# Patient Record
Sex: MALE | Race: WHITE | NOT HISPANIC OR LATINO | Employment: STUDENT | URBAN - METROPOLITAN AREA
[De-identification: names, ages, dates, MRNs, and addresses within clinical notes are randomized per-mention and may not be internally consistent; named-entity substitution may affect disease eponyms.]

---

## 2021-03-31 ENCOUNTER — TELEPHONE (OUTPATIENT)
Dept: REHABILITATION | Facility: REHABILITATION | Age: 26
End: 2021-03-31

## 2021-03-31 NOTE — TELEPHONE ENCOUNTER
"Received call from mother, Steffanie Chaney, regarding transfer of services from 3rd party driving school (\"[employer] \" in [place]) to [facility] due to scheduling difficulty and geographical proximity. Mother reports dx of autism with concerns with anxiety/OCD. Per mother, prospective client feels he needs more time to learn and is not yet ready to take his test. Mother reports driving with her son in addition to 16 hours of prior training (previously covered under waiver program) and reports concerns with managing multi-stop intersections, knowing right of way in nuanced situations, appropriate responses to yield, etc. Mother reports coverage by [facility] for additional training. Mother counseled to contact [facility] counselor to coordinate referral and coverage of services. Mother also encouraged to obtain any records from [employer] for review by ANATOLY. Mother informed that a formal evaluation will be required prior to continuation of training with [facility].    Vinny Rob MS, OTR/L, DRS, CDI  Occupational Therapist   Rehabilitation Specialist, Certified     "

## 2021-04-15 ENCOUNTER — APPOINTMENT (RX ONLY)
Dept: URBAN - METROPOLITAN AREA CLINIC 374 | Facility: CLINIC | Age: 26
Setting detail: DERMATOLOGY
End: 2021-04-15

## 2021-04-15 DIAGNOSIS — D18.0 HEMANGIOMA: ICD-10-CM

## 2021-04-15 DIAGNOSIS — D22 MELANOCYTIC NEVI: ICD-10-CM

## 2021-04-15 DIAGNOSIS — Z71.89 OTHER SPECIFIED COUNSELING: ICD-10-CM

## 2021-04-15 DIAGNOSIS — L82.1 OTHER SEBORRHEIC KERATOSIS: ICD-10-CM

## 2021-04-15 DIAGNOSIS — L81.4 OTHER MELANIN HYPERPIGMENTATION: ICD-10-CM

## 2021-04-15 PROBLEM — D22.5 MELANOCYTIC NEVI OF TRUNK: Status: ACTIVE | Noted: 2021-04-15

## 2021-04-15 PROBLEM — D48.5 NEOPLASM OF UNCERTAIN BEHAVIOR OF SKIN: Status: ACTIVE | Noted: 2021-04-15

## 2021-04-15 PROBLEM — D18.01 HEMANGIOMA OF SKIN AND SUBCUTANEOUS TISSUE: Status: ACTIVE | Noted: 2021-04-15

## 2021-04-15 PROCEDURE — ? PHOTO-DOCUMENTATION

## 2021-04-15 PROCEDURE — 99203 OFFICE O/P NEW LOW 30 MIN: CPT | Mod: 25

## 2021-04-15 PROCEDURE — 11103 TANGNTL BX SKIN EA SEP/ADDL: CPT

## 2021-04-15 PROCEDURE — ? COUNSELING

## 2021-04-15 PROCEDURE — 11102 TANGNTL BX SKIN SINGLE LES: CPT

## 2021-04-15 PROCEDURE — ? BIOPSY BY SHAVE METHOD

## 2021-04-15 PROCEDURE — ? SUNSCREEN RECOMMENDATIONS

## 2021-04-15 PROCEDURE — ? FULL BODY SKIN EXAM

## 2021-04-15 ASSESSMENT — LOCATION DETAILED DESCRIPTION DERM
LOCATION DETAILED: LEFT SUPERIOR LATERAL LOWER BACK
LOCATION DETAILED: LEFT ANTERIOR PROXIMAL THIGH
LOCATION DETAILED: EPIGASTRIC SKIN
LOCATION DETAILED: LEFT SUPERIOR FOREHEAD
LOCATION DETAILED: RIGHT LATERAL SUPERIOR CHEST
LOCATION DETAILED: RIGHT INFERIOR MEDIAL UPPER BACK

## 2021-04-15 ASSESSMENT — LOCATION ZONE DERM
LOCATION ZONE: FACE
LOCATION ZONE: LEG
LOCATION ZONE: TRUNK

## 2021-04-15 ASSESSMENT — LOCATION SIMPLE DESCRIPTION DERM
LOCATION SIMPLE: LEFT THIGH
LOCATION SIMPLE: ABDOMEN
LOCATION SIMPLE: RIGHT UPPER BACK
LOCATION SIMPLE: LEFT FOREHEAD
LOCATION SIMPLE: CHEST
LOCATION SIMPLE: LEFT LOWER BACK

## 2021-04-22 ENCOUNTER — TELEPHONE (OUTPATIENT)
Dept: REHABILITATION | Facility: REHABILITATION | Age: 26
End: 2021-04-22

## 2021-04-22 ENCOUNTER — TRANSCRIBE ORDERS (OUTPATIENT)
Dept: REHABILITATION | Facility: REHABILITATION | Age: 26
End: 2021-04-22

## 2021-04-22 DIAGNOSIS — F84.0 AUTISTIC DISORDER: Primary | ICD-10-CM

## 2021-04-22 DIAGNOSIS — F42.9 OBSESSIVE-COMPULSIVE DISORDER, UNSPECIFIED: ICD-10-CM

## 2021-04-22 NOTE — TELEPHONE ENCOUNTER
DRP order received/transcribed from PCP. Left message for patient's mother to cassandra both clinical/road eval. Please schedule with either EB or BL on a day with no other appointments starting 9am-1pm. Patient will go through [facility]--do not collect copayment

## 2021-05-06 ENCOUNTER — APPOINTMENT (RX ONLY)
Dept: URBAN - METROPOLITAN AREA CLINIC 374 | Facility: CLINIC | Age: 26
Setting detail: DERMATOLOGY
End: 2021-05-06

## 2021-05-06 DIAGNOSIS — D22 MELANOCYTIC NEVI: ICD-10-CM

## 2021-05-06 PROBLEM — D22.5 MELANOCYTIC NEVI OF TRUNK: Status: ACTIVE | Noted: 2021-05-06

## 2021-05-06 PROBLEM — D22.39 MELANOCYTIC NEVI OF OTHER PARTS OF FACE: Status: ACTIVE | Noted: 2021-05-06

## 2021-05-06 PROCEDURE — 12051 INTMD RPR FACE/MM 2.5 CM/<: CPT

## 2021-05-06 PROCEDURE — 11441 EXC FACE-MM B9+MARG 0.6-1 CM: CPT

## 2021-05-06 PROCEDURE — 99212 OFFICE O/P EST SF 10 MIN: CPT | Mod: 25

## 2021-05-06 PROCEDURE — ? PUNCH EXCISION

## 2021-05-06 PROCEDURE — ? PHOTO-DOCUMENTATION

## 2021-05-06 PROCEDURE — ? OBSERVATION

## 2021-05-06 ASSESSMENT — LOCATION DETAILED DESCRIPTION DERM
LOCATION DETAILED: LEFT SUPERIOR FOREHEAD
LOCATION DETAILED: LEFT SUPERIOR LATERAL LOWER BACK

## 2021-05-06 ASSESSMENT — LOCATION ZONE DERM
LOCATION ZONE: TRUNK
LOCATION ZONE: FACE

## 2021-05-06 ASSESSMENT — LOCATION SIMPLE DESCRIPTION DERM
LOCATION SIMPLE: LEFT LOWER BACK
LOCATION SIMPLE: LEFT FOREHEAD

## 2021-06-14 ENCOUNTER — HOSPITAL ENCOUNTER (OUTPATIENT)
Dept: REHABILITATION | Facility: REHABILITATION | Age: 26
Setting detail: THERAPIES SERIES
Discharge: HOME | End: 2021-06-14
Attending: FAMILY MEDICINE
Payer: COMMERCIAL

## 2021-06-14 DIAGNOSIS — F84.0 AUTISTIC SPECTRUM DISORDER: Primary | ICD-10-CM

## 2021-06-14 DIAGNOSIS — F42.9 OBSESSIVE-COMPULSIVE DISORDER, UNSPECIFIED TYPE: ICD-10-CM

## 2021-06-14 PROCEDURE — 99000065 HC VEHICLE EVAL 120 MIN

## 2021-06-14 PROCEDURE — 99000064 HC PRE-DRIVER EVAL 120 MIN

## 2021-06-14 ASSESSMENT — MONTREAL COGNITIVE ASSESSMENT (MOCA): WHAT IS THE TOTAL SCORE (OUT OF 30): NT
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
## 2021-06-14 NOTE — PROGRESS NOTES
[facility]   REHABILITATION PROGRAM  DRIVING EVALUATION REPORT    EVALUATION DATE: 2021    NAME:  Chriss Rodriguez : 1995 AGE: 25 y.o.  MRN: [id number]     ADDRESS:  [address] [place] [address] [address] [place] [address]     [facility] PHONE NUMBER:   [phone]    REFERRING PHYSICIAN:    Gypsy Kellogg MD  [address] [place] [address] Rd  [address]  [place] [address]      DIAGNOSIS: F84.0 Autism, F42.9 OCD  ONSET:  Birth                Other Medical Conditions: ADD, dyslexia                                   Hearing Deficits:  None  Communication Deficits:  Reports having dyslexia     Occupation:  Student   Funding Source:  [employer], [employer]    OVR Counselor:  Anne Florentino    BACKGROUND/HISTORY     Mr. Edward Rodriguez is a pleasant and well-appearing 24 y/o male who presents accompanied by his sister for completion of a comprehensive driving evaluation sponsored by the Office of Vocational Rehabilitation. Chriss is currently enrolled at [employer] where he is a rising senior studying applied engineering through an integrated studies program. He presently lives at home with his family and reports independence for ADLs/IADLs. His past medical history is notable for high-functioning autism spectrum disorder, obsessive-compulsive disorder, attention-deficit disorder, and dyslexia. Chriss reports initially pursuing driving at age 16 when he completed a provisional pre- assessment, though was ultimately recommended to delay driving due to decreased maturity at the time. He obtained his learner's permit in 2020 and completed 16 hours of  training via the [employer] ([place], [phone]), as well as supervised driving with both his mother and adult-sister. His mother previously contacted the  Rehab Program in 2021 regarding transfer of services to our program due to scheduling difficulty and inconvenient location to address ongoing concerns  "with determining right-of-way in nuanced situations and appropriate decision-making regarding yielding. Review of limited records from [facility]  reveals services were ultimately discontinued due to insufficient progress with ongoing concerns. Review of his most recent driving experience on 2021 with his adult-sister reveals potential concerns with awareness of other traffic especially in his blind spot, judging speed/braking distance ahead of turns, determining right-of-way at intersections, and possible attention-related difficulties described as \"getting stuck looking in one direction.\" Client also reports variable anxiety and fearfulness of getting into an accident that may further contribute to difficulties behind-the-wheel. He now presents for formal evaluation of his driving potential in consideration of additional  training services via the  Rehab Program.      LICENSE STATUS  Valid License/Permit:  Yes       State:  [place]   #:  [id number]        Expiration Date:  Unknown  Restrictions:  Unknown     Class:  C  Reported to [employer]:  No     Must Comply With:  None  Comments:  Permit; reports it is currently missing after his family moved to a new residence. ID card exp 2023     DRIVING HISTORY/ VEHICLE INFORMATION:  \"Why do you want to drive?\": \"Just I want to be more independent for myself, I always did want to drive, but didn't know the best way to do that.\"  Miles Driven Per Year:  New   Age Started Drivin years old  Last Drove: 2021  Accident History: None reported  Primary Driving Conditions:  Local driving, Highway driving, Multi-marissa roads, Daylight hours, Night driving    Current Vehicle:  Year:   ~2016     Make:   Honda   Model:  CR-V    Type:    SUV  Vehicle Options:  AT, PB/ABS   Safety Equipment:  Marissa Departure Warning, Blind Spot Warning, Back-up Camera, Front Parking Sensors, Rear Parking Sensors     VISUAL STATUS:     OD (Right) OS (Left) OU (Both) "   OCULAR ROM NT NT    CONVERGENCE NT NT    VISUAL TRACKING/PURSUITS NT NT    SACCADES NT NT    VISUAL FIELDS  [place] Minimum Standard: 120° at horizontal meridian Full to confrontation Full to confrontation      DEPTH PERCEPTION  via Optec 5000  WFL < 50 seconds of arc 20 seconds of arc (WNL)   VISUAL ACUITY (DISTANCE)  via 10' Snellen Chart  [place] Minimum Standard: 20/40 for unrestricted driving 20/15 corrected   20/20 corrected    20/13-2 corrected     CONTRAST SENSITIVITY   NT          PHYSICAL STATUS:     Upper Extremity Function    HAND DOMINANCE Left      LUE RUE   ROM       SHOULDER       ELBOW       WRIST       HAND   WFL  WFL  WFL  WFL   WFL  WFL  WFL  WFL   STRENGTH       SHOULDER             Flexion            Extension            Abduction            Hor Abduction            ELBOW            Flexion            Extension        WRIST             Flexion            Extension       HAND - Grasp  Comments     (4+/5) good plus  (4/5) good  NT  NT    (4+/5) good plus  (4/5) good    NT  NT  NT  Client reports hesitancy to push against evaluator, resulting in premature give against resistance     (4+/5) good plus  (4/5) good  NT  NT    (4+/5) good plus  (4/5) good    NT  NT  NT  Client reports hesitancy to push against evaluator, resulting in premature give against resistance   COORDINATION       WFL WFL   SENSATION WFL WFL     Lower Extremity Function     LLE RLE   ROM       HIP       KNEE       ANKLE   WFL  WFL  WFL   WFL  WFL  WFL   STRENGTH       HIP        Flexion        Extension       KNEE         Flexion         Extension       ANKLE         Dorsiflexion         Plantarflexion  Comments       (4/5) good  (4/5) good    (4-/5) good minus  (4/5) good    (4+/5) good plus  (4+/5) good plus    Client reports hesitancy to push against evaluator, resulting in premature give against resistance     (4/5) good  (4/5) good    (4-/5) good minus  (4/5) good    (4+/5) good plus  (4+/5) good plus    Client reports hesitancy to  "push against evaluator, resulting in premature give against resistance   COORDINATION   Not formally assessed; presumed WFL WFL to observe pedal operation   SENSATION WFL WFL      Mobility Status    Transfers Independent   Ambulation Independent   Mobility Device None   Wheelchair Use None   Rapid Pace Walk Test NT   Balance WFL   Cervical Rotation R: WFL                             L: WFL     COGNTIVE/ PERCEPTUAL STATUS:    MVPT (Motor Free Visual Perceptual Test)      WNL: > 1 stnd deviation +/- mean (u = 100) Raw Score: 35/45  Standard Score: 93  Percentile Rank: 32nd %-ile (<1 stnd dev below mean)  IMPRESSION:  WNL   Bunn Visual Organization Test Not tested   Trail Making      Trail A Average = 29 sec, Deficient > 78 sec          Rule of Thumb = Most in 90 sec      Trail B Average = 75 sec, Deficient > 273 sec           Rule of Thumb = Most in 180 sec Trail A:  22.97 seconds  (WNL)  [place] B:  133.32 seconds w/ 2 episodes of freezing and 2 errors requiring redirection; client reports, \"I had to think about numbers and letters in a new way\" (WFL)   Snell[place] Maze Test      Norms: < 60 seconds, no errors 26.52 seconds, 0 errors  (WNL)   Brief Cognitive Assessment Tool (BCAT) Not tested   Colman Cognitive Assessment (MoCA) Not tested   Right-Left Discrimination Questionable verbal L/R confusion   Visual Attention WFL      Simple Brake Reaction Time (SBRT)         Range 0.481-0.734 sec overall        Average of 10 trials Set 1: 0.625 sec avg  Set 2: 0.586 sec avg  Total: 0.605 sec avg        Norms for Age/Sex 75th Percentile is .41 sec  50th Percentile is .47 sec  25th Percentile is .52 sec        Percentile Equivalent for Age/Sex Below 5th %-ile  (IMPAIRED)        Pedal Errors 0/20        Comments: Orientation and warm-up provided. Client noted with slowing RT on consecutive trials for 1st set of 10; improved maintenance of RT on 2nd set. In review of performance, client reports, \"I felt focused at first, but " "then myself trailing off.\"        Traffic Sign Recognition:   Impaired--68% accuracy  · Symbolic:  10.5/15--uncertainly and/or errors for slippery road, right marissa ending, divided highway, bear right around obstacle, and school crossing (partial credit for pedestrian)  · Verbal:  10/15--uncertainly and/or errors for no passing zone, one marissa bridge, low shoulder, underpass clearance, and no outlet     General Observations:  · Insight/ Judgment: Client presents with what appears to be appropriate insight into  training needs  · Attitude/ Affect: Pleasant, cooperative  · Distractibility: Mild intermittent distractibility noted; difficulty with sustained attention/concentration for Trail B observed  · Impulsivity: None noted; client patiently awaits full instructions for all tasks      IN-VEHICLE ASSESSMENT    ADAPTED EQUIPMENT: None    TYPES OF ROAD: Parking Lot/ [facility] Grounds    Evaluation took place exclusively on the private grounds of [facility] due to the client arriving to today's evaluation without his learner's permit. Client and sister educated on limitation of evaluation to hospital grounds/parking lot only and agreed to move forward with evaluation. An orientation to the testing vehicle was provided, including verbal assistance with adjustment of his mirrors (additional training required). The client appropriately donned his seatbelt without cueing or assistance. The client was given a warm up period including an introduction to the location of all primary and secondary controls in the vehicle. Client was able to negotiate the [facility] parking lot with extremely limited traffic to complete 2 small loops and 2 large loops, as well as 2 quick stops and an alternating series of right/left turns through a narrow parking area. Parking lot performance was notable for the following:    Performance Measures Status  Comments   Transfer Status / Entry into Vehicle  incl AD management as relevant WNL    Pre-Driving " Check  incl Vehicle Fitting & Seat Belt Needs assistance Requires verbal assistance for adjustment of side mirrors; initially adjusts to see full side of vehicle. Will require additional training.   Pedal Control  (Smooth Stopping/ Accelerating) WFL WFL for simple parking lot conditions; smooth pedal control noted throughout. Adequate responsiveness for unannounced quick stops x2   Steering Control WFL Smooth steering observed to negotiate curves and turns at low speeds. Will require further assessment in more complex driving environments.   Speed Control WFL Modulates speed appropriately under parking lot conditions. Will require further assessment in more complex driving environments.   Marissa Control/ Vehicle Positioning Concerns noted Client noted with initial inconsistent marissa positioning, at times driving on the right side of the road, though occasionally turning onto the left side of the road without noted attempts to shift right until cued to do so. Client re-educated on staying right with carryover noted for all subsequent driving. Will require further assessment in more complex driving environments.   Turning  incl head checks      Concerns noted Client noted with sometimes brief head checks to the right when completing turns. Vehicle spacing for turns initially resulting in wide right turns and shorter left turns, requiring redirection. Improves following cueing. Negotiates series of alternating right/left turns through tighter parking area with adequate spacing (maintains turn signal use throughout). Will require further assessment in more complex driving environments.   Turn Signal Use WFL Consistent turn signal use observed in parking lot. Will require further assessment in more complex driving environments.   Gap Selection Not observed Will require further assessment in more complex driving environments.   Sign/ Signal Awareness  incl regulatory and warning signs Grossly WFL Grossly WFL for basic signs  in parking lot setting (Stop Sign, One-Way, Do Not Enter, 15 MPH). Fails to see x1 stop sign partially obscured by parked vehicle, requiring use of instructor brake. Client observes all other stop signs following this.   Stopping Distance Not observed Will require further assessment in more complex driving environments.   Following Distance Not observed Will require further assessment in more complex driving environments.   Attention to nonverbal communication Not observed Will require further assessment in more complex driving environments.   Wilbert Changing  incl mirror/head checks and TS Not observed Will require further assessment in more complex driving environments.   Merging Not observed Will require further assessment in more complex driving environments.   Highway/ Expressway Driving Not observed Will require further assessment in more complex driving environments.   Space Management (SIPDE)  incl /Forward Scanning Not observed Will require further assessment in more complex driving environments.   Parking: Stall WFL Pulls out of and into stall space with appropriate spacing   Parking: Angle NT    Parking: Parallel Needs assistance Trial parallel parking completed in 24 x 8' space; client requires step by step verbal coaching to complete. Discontinued after 6 moves, not within space.   Special Maneuver  (K-turn/ 3 Point Turn/ U-turn) Concerns noted Requires verbal cueing to look back and check mirrors prior to completing; after surveying area, client able to complete steering maneuvers to complete 3-pt turn in allotted space   Navigation  Not observed Able to repeat loops in parking lot after initial cueing for route. Will require further assessment in more complex driving environments.   Response to Emergency Vehicles  Not observed Will require further assessment in more complex driving environments.     ASSESSMENT/ RECOMMENDATIONS     OT-DRIVE risk assessment completed with noted risk factors  including decreased reaction time on simple brake reaction time testing below normal limits for age/sex (below 5th %-ile), decreased traffic sign recognition/knowledge (68% accuracy), and observed difficulty with sustained attention/concentration to complete Trail Making Part B testing, though performance may have been impacted by known history of dyslexia. Client otherwise scored within functional limits/ within normal limits for vision, visual-perceptual processing, attention (Ripon Making Ripon A), visuoconstructional and executive skills ([place] Maze), and motor skills. Observed behind-the-wheel performance was limited to parking lot only due to the client not having his learner's permit at the time of today's evaluation. Parking lot performance reveals difficulties consistent with both decreased knowledge of the rules of the road (e.g. driving on the wrong side of the road, turning onto the wrong side of the road), as well as decreased functional awareness of safe driving behaviors consistent with his status as a novice  (e.g. consistent head checks before turns/at intersections, awareness of rearward traffic/obstacles before backing up). Performance reviewed with the client, as well as his sister and mother. He presents with good potential for  training services and will benefit from a minimum of 10-15 hours of  training to further explore his driving potential in a variety of traffic situations. Client informed a copy of his results would be provided to his referring provider, as well as his OVR counselor in consideration of authorization for  training services.  Client and family verbalize understanding of recommendations.      Vinny Rob MS, OTR/L, JOSEPH, CDI  Occupational Therapist   Rehabilitation Specialist, Certified     Time In: 0910  Time Out: 1230  Total Time (minutes): 200 min

## 2021-06-28 ENCOUNTER — HOSPITAL ENCOUNTER (OUTPATIENT)
Dept: REHABILITATION | Facility: REHABILITATION | Age: 26
Setting detail: THERAPIES SERIES
Discharge: HOME | End: 2021-06-28
Attending: FAMILY MEDICINE
Payer: COMMERCIAL

## 2021-06-28 DIAGNOSIS — F42.9 OBSESSIVE-COMPULSIVE DISORDER, UNSPECIFIED TYPE: ICD-10-CM

## 2021-06-28 DIAGNOSIS — F84.0 AUTISM SPECTRUM DISORDER: Primary | ICD-10-CM

## 2021-06-28 PROCEDURE — 99000018 HC VEHICLE LESSON 60 MIN

## 2021-06-28 NOTE — LETTER
"    [facility]  REHABILITATION PROGRAM  [address] [place]  [address]  [phone]    2021    Office of Vocational Rehabilitation  Attn: Anne Florentino  [address]  [place] [address]    Re:     Chriss Rodriguez  :  1995  MRN:  [id number]    Dear Anne,    This letter is to inform you that Chriss used 1 out of 1 authorized units/hours on his current PO.  Please see the attached Driving Progress Report.  Below is a summary of provided services to date:    Month of Service:  2021  Units Used This Month:  1  Units Used To Date:  1  Units Remainin    Thank you again for your referral.  Please feel free to call me with any questions regarding this case at [phone].    Sincerely,    Vinny Rob MS, OTR/L, DRS, CDI  Occupational Therapist   Rehabilitation Specialist, Certified       Attachment(s):  Driving Progress Report, 2021 Lesson Invoice        [facility]   REHABILITATION PROGRAM  PROGRESS NOTE    DATE OF SERVICE: 2021    NAME:  Chriss Rodriguez          : 1995          AGE: 25 y.o.          MRN: [id number]     ADDRESS:  [address] [place] [address] [address] [place] [address]     [facility] PHONE NUMBER:   [phone]    REFERRING PHYSICIAN:    Gypsy Kellogg MD  [address] [place] [address]  [address]  [place] [address]     FUNDING SOURCE:  OVR      PURCHASE ORDER:  [id number]  COUNSELOR:  Anne Florentino, [phone]  LESSONS TO DATE (including current):  1   TIME IN: 11:19   TIME OUT: 12:18   TOTAL TIME: 59 minutes    SUBJECTIVE:  \"The social aspect of knowing the rules of the road is something I need to work on.\"    OBJECTIVE:    BASIC  EDUCATION:  The following driving education/ maneuvers were reviewed with the :  · Turner System:  \"Leave yourself an out\"  · Right-of-Way  · Turning    ADAPTED EQUIPMENT:  None    TYPES OF ROAD:  · PARKING LOT:  [facility] Grounds, [place]  Loop  · SECONDARY ROADS:  " [address], [address], [address]  · PRIMARY ROADS:  N/A          Performance Measures Status  Comments   Transfer Status / Entry into Vehicle WNL     Pre-Driving Check Progressing Needs cueing; adjust side mirrors appropriately following initial prompting   Pedal Control  (Smooth Stopping/ Accelerating) WFL    * Generally WFL acceleration for local driving    Occasionally firm braking due to late recognition of stop signs/signals.   Steering Control WFL Smooth steering observed to negotiate curves and turns on local roads   Speed Control Mild concerns noted Generally adequate observance of speed zones and speed zone changes. Drives occasionally underspeed (10-15 MPH) requiring cueing to increase speed to prevent obstruction of traffic.   Marissa Control/ Vehicle Positioning Progressing --Improved marissa position on grounds of [facility]; initially drives in middle of unlined road, though self-corrects. --Turns consistently onto right side of road.  --Drifts to right x1 and in middle of unlined backroad x1 requiring prompting to regain proper marissa position. --Manages opposing traffic on unlined road appropriately.   Turning  incl head checks      Ongoing concerns noted --Turn spacing generally WFL  --Head checks for simple intersections generally WFL, though with concerns at more complex intersections.  --Consistent difficulty noted judging appropriate speed for completion of turns; client consistently slows as if to make complete stop prior to turn regardless of traffic. Education provided regarding slowing ahead of turns and preparatory scanning ahead of turns to promote more fluid dynamic turning and prevent unnecessary slowing/stopping to complete head checks except when warranted by traffic. Difficulty exacerbated by difficulty determining ROW.  --Ongoing education required regarding ROW for protected vs. unprotected left turns at  "controlled intersections  --Decreased initiation at intersections in face of more complex circumstances (e.g. traffic from multiple directions), requiring cueing at times to clear intersections more quickly and use of instructor accelerator on 1 occasion to clear intersection ahead of oncoming cross-traffic; client completes multiple head checks to L/R, however due to complexity, hesitates on pull-out resulting in increased time spent in intersection   Turn Signal Use Grossly WFL Generally consistent turn signal use, though omits in traffic x1   Gap Selection Concerns noted Some difficulty noted judging available gap for completion of unprotected turns, though this is likely to do with difficulty determining ROW   Sign/ Signal Awareness  incl regulatory and warning signs Concerns noted --Late recognition of 1 stop sign on grounds of [facility] resulting in stopping past sign. Late recognition of 2 stop signs in traffic, resulting in stopping past line on 1 occasion.  --Late recognition of single yellow traffic light, though stops in time   Stopping Distance Mild concerns noted Stops appropriately behind other vehicles in traffic; education provided on always leaving yourself an out with visual cue of maintaining visual of forward vehicle's tires on ground.   Following Distance Not observed Will require further assessment in more complex driving environments.   Attention to nonverbal communication Concerns noted Recognizes non-verbal wave of opposing vehicle at intersection, though opts to allow them to go instead. Client requires prompting to wave on other vehicle rather than verbally \"letting them go\" without gestural indication.   Wilbert Changing   Not observed Will require further assessment in more complex driving environments.   Merging Not observed Will require further assessment in more complex driving environments.   Highway/ Expressway Driving Not observed Will require further assessment in more complex driving " environments.   Space Management (SIPDE) Mild concerns noted Variable  time observed to detect upcoming signs/signals. Generally responsive to forward hazards. Requires cueing to check left before swinging wide to pass hazard on right.    Will require further assessment in more complex driving environments.   Parking: Stall WFL Pulls out of and into stall space with appropriate spacing   Parking: Angle NT     Parking: Parallel Assistance/ cueing required Not addressed this session.  Will require further training.   Special Maneuver  (K-turn/ 3-pt turn/ U-turn) Concerns noted Not addressed this session. Will require further training.   Navigation  Mild concerns noted Follows 1 step verbal commands with minimal repetition, though requires repetition of 2 step commands consistently for directions.   Response to Emergency Vehicles  Not observed Will require further assessment in more complex driving environments.                ASSESSMENT/ PLAN:    Chriss returns following comprehensive driving evaluation [place]'s permit (exp. 6/16/2022) for initiation of  training services. Warm-up drive completed on private grounds of [facility] with improved overall performance relative to his initial evaluation findings. Road performance significant for concerns with consistent  for upcoming intersections and posted signage, determining right-of-way, and decision-making and initiation to complete turns in varying situations. Client otherwise demonstrates adequate pedal and steering control and generally appropriate speed management and marissa control on secondary roads with minor concerns. He will continue to benefit from skilled  training services to continue to assess his driving potential. Authorization received confirming approval for 12 lesson hours (inclusive of today) through the Office of Vocational Rehabilitation. Plan to schedule future sessions at approximate intensity of  1-2hr/week as able.    Vinny Rob MS, OTR/L, DRS, CDI  Occupational Therapist   Rehabilitation Specialist, Certified

## 2021-06-28 NOTE — PROGRESS NOTES
"[facility]   REHABILITATION PROGRAM  PROGRESS NOTE    DATE OF SERVICE: 2021    NAME:  Chriss Rodriguez          : 1995          AGE: 25 y.o.          MRN: [id number]     ADDRESS:  [address] [place] [address] Dr Yeison [place] [address]     [facility] PHONE NUMBER:   [phone]    REFERRING PHYSICIAN:    Gypsy eKllogg MD  [address] [place] [address]  [address]  [place] [address]     FUNDING SOURCE:  OVR      PURCHASE ORDER:  [id number]  COUNSELOR:  Anne Florentino, [phone]  LESSONS TO DATE (including current):  1   TIME IN: 11:19   TIME OUT: 12:18   TOTAL TIME: 59 minutes    SUBJECTIVE:  \"The social aspect of knowing the rules of the road is something I need to work on.\"    OBJECTIVE:    BASIC  EDUCATION:  The following driving education/ maneuvers were reviewed with the :  · Turner System:  \"Leave yourself an out\"  · Right-of-Way  · Turning    ADAPTED EQUIPMENT:  None    TYPES OF ROAD:  · PARKING LOT:  [facility], [place]  [address]  · SECONDARY ROADS:  [address], [address], [address]  · PRIMARY ROADS:  N/A    Performance Measures Status  Comments   Transfer Status / Entry into Vehicle WNL     Pre-Driving Check Progressing Needs cueing; adjust side mirrors appropriately following initial prompting   Pedal Control  (Smooth Stopping/ Accelerating) WFL    * Generally WFL acceleration for local driving    Occasionally firm braking due to late recognition of stop signs/signals.   Steering Control WFL Smooth steering observed to negotiate curves and turns on local roads   Speed Control Mild concerns noted Generally adequate observance of speed zones and speed zone changes. Drives occasionally underspeed (10-15 MPH) requiring cueing to increase speed to prevent obstruction of traffic.   Marissa Control/ Vehicle Positioning Progressing --Improved marissa position on grounds of [facility]; initially drives in middle of " unlined road, though self-corrects. --Turns consistently onto right side of road.  --Drifts to right x1 and in middle of unlined backroad x1 requiring prompting to regain proper marissa position. --Manages opposing traffic on unlined road appropriately.   Turning  incl head checks      Ongoing concerns noted --Turn spacing generally WFL  --Head checks for simple intersections generally WFL, though with concerns at more complex intersections.  --Consistent difficulty noted judging appropriate speed for completion of turns; client consistently slows as if to make complete stop prior to turn regardless of traffic. Education provided regarding slowing ahead of turns and preparatory scanning ahead of turns to promote more fluid dynamic turning and prevent unnecessary slowing/stopping to complete head checks except when warranted by traffic. Difficulty exacerbated by difficulty determining ROW.  --Ongoing education required regarding ROW for protected vs. unprotected left turns at controlled intersections  --Decreased initiation at intersections in face of more complex circumstances (e.g. traffic from multiple directions), requiring cueing at times to clear intersections more quickly and use of instructor accelerator on 1 occasion to clear intersection ahead of oncoming cross-traffic; client completes multiple head checks to L/R, however due to complexity, hesitates on pull-out resulting in increased time spent in intersection   Turn Signal Use Grossly WFL Generally consistent turn signal use, though omits in traffic x1   Gap Selection Concerns noted Some difficulty noted judging available gap for completion of unprotected turns, though this is likely to do with difficulty determining ROW   Sign/ Signal Awareness  incl regulatory and warning signs Concerns noted --Late recognition of 1 stop sign on grounds of [facility] resulting in stopping past sign. Late recognition of 2 stop signs in traffic, resulting in stopping past line on  "1 occasion.  --Late recognition of single yellow traffic light, though stops in time   Stopping Distance Mild concerns noted Stops appropriately behind other vehicles in traffic; education provided on always leaving yourself an out with visual cue of maintaining visual of forward vehicle's tires on ground.   Following Distance Not observed Will require further assessment in more complex driving environments.   Attention to nonverbal communication Concerns noted Recognizes non-verbal wave of opposing vehicle at intersection, though opts to allow them to go instead. Client requires prompting to wave on other vehicle rather than verbally \"letting them go\" without gestural indication.   Wilbert Changing   Not observed Will require further assessment in more complex driving environments.   Merging Not observed Will require further assessment in more complex driving environments.   Highway/ Expressway Driving Not observed Will require further assessment in more complex driving environments.   Space Management (SIPDE) Mild concerns noted Variable  time observed to detect upcoming signs/signals. Generally responsive to forward hazards. Requires cueing to check left before swinging wide to pass hazard on right.    Will require further assessment in more complex driving environments.   Parking: Stall WFL Pulls out of and into stall space with appropriate spacing   Parking: Angle NT     Parking: Parallel Assistance/ cueing required Not addressed this session.  Will require further training.   Special Maneuver  (K-turn/ 3-pt turn/ U-turn) Concerns noted Not addressed this session. Will require further training.   Navigation  Mild concerns noted Follows 1 step verbal commands with minimal repetition, though requires repetition of 2 step commands consistently for directions.   Response to Emergency Vehicles  Not observed Will require further assessment in more complex driving environments.      ASSESSMENT/ " PLAN:    Chriss returns following comprehensive driving evaluation with valid PA learner's permit (exp. 6/16/2022) for initiation of  training services. Warm-up drive completed on private grounds of [facility] with improved overall performance relative to his initial evaluation findings. Road performance significant for concerns with consistent  for upcoming intersections and posted signage, determining right-of-way, and decision-making and initiation to complete turns in varying situations. Client otherwise demonstrates adequate pedal and steering control and generally appropriate speed management and marissa control on secondary roads with minor concerns. He will continue to benefit from skilled  training services to continue to assess his driving potential. Authorization received confirming approval for 12 lesson hours (inclusive of today) through the Office of Vocational Rehabilitation. Plan to schedule future sessions at approximate intensity of 1-2hr/week as able.    Vinny Rob MS, OTR/L, JOSEPH, CDI  Occupational Therapist   Rehabilitation Specialist, Certified

## 2021-07-07 ENCOUNTER — HOSPITAL ENCOUNTER (OUTPATIENT)
Dept: REHABILITATION | Facility: REHABILITATION | Age: 26
Setting detail: THERAPIES SERIES
Discharge: HOME | End: 2021-07-07
Attending: FAMILY MEDICINE
Payer: COMMERCIAL

## 2021-07-07 DIAGNOSIS — F42.9 OBSESSIVE-COMPULSIVE DISORDER, UNSPECIFIED TYPE: ICD-10-CM

## 2021-07-07 DIAGNOSIS — F84.0 AUTISM SPECTRUM DISORDER: Primary | ICD-10-CM

## 2021-07-07 PROCEDURE — 99000018 HC VEHICLE LESSON 60 MIN

## 2021-07-07 NOTE — PROGRESS NOTES
"[facility]   REHABILITATION PROGRAM  PROGRESS NOTE    DATE OF SERVICE: 2021    NAME:  Chriss Rodriguez          : 1995          AGE: 25 y.o.          MRN: [id number]     ADDRESS:  [address] [place] [address] Dr Yeison [place] [address]     [facility] PHONE NUMBER:   [phone]    REFERRING PHYSICIAN:    Gypsy Kellogg MD  [address]  [address]  [place] [address]     FUNDING SOURCE:  OVR      PURCHASE ORDER:  [id number]  COUNSELOR:  Anne Florentino, [phone]  LESSONS TO DATE (including current):  2   TIME IN: 11:20   TIME OUT: 12:16   TOTAL TIME:  56 minutes    SUBJECTIVE:  \"Well that was good. I had a great lesson.\"    OBJECTIVE:    BASIC  EDUCATION:  The following driving education/ maneuvers were reviewed with the :  · Right-of-Way  · Multi-marissa turning    ADAPTED EQUIPMENT:  None    TYPES OF ROAD:  · PARKING LOT:  [facility]  · SECONDARY ROADS:  [address], [address], [address], [address], [facility], [address]  · PRIMARY ROADS:  N/A    Performance Measures Status  Comments   Transfer Status / Entry into Vehicle WNL     Pre-Driving Check Progressing Needs cueing to initiate adjustment of mirrors before proceeding; adjusts side mirrors appropriately following initial prompting   Pedal Control  (Smooth Stopping/ Accelerating) WFL Generally WFL acceleration and braking for local driving in moderate traffic.   Steering Control WFL Smooth steering observed to negotiate curves and turns on local roads   Speed Control Mild concerns noted Improved speed this session to negotiate roads of 25-55 MPH speed limit; requires cueing on one occasion to reduce speed in 25 MPH zone and 2 cues to increase speed while completing marissa changes, rather than decreasing speed.   Marissa Control/ Vehicle Positioning Progressing Improved marissa control this session; consistently selects near-marissa when turning onto multi-marissa " "roads; mild R-murray drifting observed x5 this session, though slight   Turning  incl head checks      Progressing Improved decision-making at intersections of varying size and complexity observed this session; client cautiously yields on yellow lights and awaits green; cueing required on one occasion at a Y shaped intersection in which the client had a green light and the adjacent road forming the \"Y\" had a red light; client paused with uncertainty due to car approaching from right to its respective red light, requiring verbal cueing to proceed with ROW; adequate speed control for turns observed this session from a stop and while moving; client demonstrates good carryover for completion of left turns at protected intersections with single exception as noted above   Turn Signal Use WFL Utilized consistently   Gap Selection Progressing Selects appropriate gap for all turns this session   Sign/ Signal Awareness  incl regulatory and warning signs Progressing Good awareness of posted signs this session with single instance of late braking response to a red light   Stopping Distance Progressing Generally adequate with 1 instance of close stopping distance behind another vehicle in traffic and stops over intersection line x1   Following Distance WFL Appropriate following distance observed at highway speeds (55 MPH)   Attention to nonverbal communication Progressing Acknowledges gestural communication of other  yielding ROW to client at intersection   Marissa Changing   Initiated Completes 4 marissa changes in low traffic with good head checks and signalling; decreased speed control on 2 occasions requiring cueing to increase speed while completing marissa change to prevent undue slowing of distantly approaching traffic from behind   Merging Not observed Will require further assessment in more complex driving environments.   Highway/ Expressway Driving Not observed Will require further assessment in more complex driving " environments.   Space Management (SIPDE) Progressing Good response to forward hazards; generally adequate lead for upcoming signal changes and braking traffic, though mildly delayed on 2 occasions    Will require further assessment in more complex driving environments.   Parking: Stall Mild concerns noted Pulls out of and into stall space with appropriate spacing, though requires cueing to look behind when backing up to make an alignment correction   Parking: Angle NT     Parking: Parallel Assistance/ cueing required Not addressed this session. Will require further training.   Special Maneuver  (K-turn/ 3-pt turn/ U-turn) Concerns noted Not addressed this session. Will require further training.   Navigation  Progressing Follows 1 step verbal commands consistently   Response to Emergency Vehicles  Not observed Will require further assessment in more complex driving environments.      ASSESSMENT/ PLAN:    Chriss returns highly motivated with personal goals of continuing to address decision-making and committing to a chosen decision. Performance this session was notably improved over prior sessions with evidence of carryover of prior instruction on turning speed and decision-making at intersections with minimal cueing. While he continues to demonstrate some uncertainty regarding right-of-way, client demonstrated improved understanding and execution this session on larger roads in more complex traffic. Plan for future sessions to address concepts of merging, gap acceptance, and yielding under more complex circumstances (e.g. highway merging, marissa changes under heavier traffic conditions). He will continue to benefit from skilled  training services to continue to assess his driving potential. Next session planned for 7/14/2021 at 2pm.    Vinny Rob MS, OTR/L, DRS, CDI  Occupational Therapist   Rehabilitation Specialist, Certified

## 2021-07-14 ENCOUNTER — HOSPITAL ENCOUNTER (OUTPATIENT)
Dept: REHABILITATION | Facility: REHABILITATION | Age: 26
Setting detail: THERAPIES SERIES
Discharge: HOME | End: 2021-07-14
Attending: FAMILY MEDICINE
Payer: COMMERCIAL

## 2021-07-14 DIAGNOSIS — F84.0 AUTISM SPECTRUM DISORDER: ICD-10-CM

## 2021-07-14 DIAGNOSIS — F42.9 OBSESSIVE-COMPULSIVE DISORDER, UNSPECIFIED TYPE: ICD-10-CM

## 2021-07-14 PROCEDURE — 99000018 HC VEHICLE LESSON 60 MIN

## 2021-07-14 NOTE — PROGRESS NOTES
"[facility]   REHABILITATION PROGRAM  PROGRESS NOTE    DATE OF SERVICE: 2021    NAME:  Chriss Rodriguez          : 1995          AGE: 25 y.o.          MRN: [id number]     ADDRESS:  [address] [place] [address] Dr Ye[address] [place] [address]     [facility] PHONE NUMBER:   [phone]    REFERRING PHYSICIAN:    Gypsy Kellogg MD  [address]  [address]  [place] [address]     FUNDING SOURCE:  OVR      PURCHASE ORDER:  [id number]  COUNSELOR:  Anne Florentino, [phone]  LESSON HOURS TO DATE (including current):  3   TIME IN: 14:00   TIME OUT: 15:05   TOTAL TIME:  65 minutes    SUBJECTIVE:  \"I need to work on commitment.\"    OBJECTIVE:    BASIC  EDUCATION:  The following driving education/ maneuvers were reviewed with the :  · ZAYAS SYSTEM:  See the big picture  · Right-of-Way  · Gap selection for merging/marissa changes    ADAPTED EQUIPMENT:  None    TYPES OF ROAD:  · PARKING LOT:  [facility] [email or web address], [facility]  · SECONDARY ROADS:  [address], [address]/ [address], [address]  · PRIMARY ROADS:  [address], [address]    Performance Measures Status  Comments   Transfer Status / Entry into Vehicle WNL     Pre-Driving Check Progressing, ongoing Needs cueing to initiate adjustment of mirrors before proceeding; adjusts side mirrors appropriately following initial prompting   Pedal Control  (Smooth Stopping/ Accelerating) WFL Generally WFL acceleration and braking for local driving in moderate traffic. Some difficulty transitioning to different vehicle this session ( Nemesio Castillo) with graded pressure on brake, though generally WFL   Steering Control WFL Smooth steering observed to negotiate curves and turns on local roads   Speed Control Progressing, ongoing Improved speed this session to negotiate roads of 25-55 MPH speed limit; requires cueing to maintain speed while completing marissa changes (see below), though otherwise demonstrates generally appropriate " speed   Marissa Control/ Vehicle Positioning Progressing Continues to favor R side of road in attempts to avoid oncoming traffic from L, though maintains marissa adequately   Turning  incl head checks      Progressing Client demonstrates appropriate speed control through turns, though continues to be hesitant in selecting timing of turn; continues with appropriate head checks at turns to ensure a clear path   Turn Signal Use WFL Utilized consistently   Gap Selection Progressing Selects appropriate gap for all turns this session, favoring to yield until larger gap available; difficulty judging/accepting gap for marissa changes in traffic, resulting at times in delayed initiation of marissa change and eventual discontinuation of marissa change due to loss of adequate gap   Sign/ Signal Awareness  incl regulatory and warning signs Progressing, challenged Delayed decision-making in response to yellow light in which client had enough time to stop and/or maintain speed to clear intersection; client instead coasts/decelerates in response to yellow light and proceeds through intersection with light turning red    Fails to decisively yield on ramp to join highway on-ramp, slowing in response with delayed decision-making and coasting into on-ramp marissa, forcing vehicle with ROW to slow/stop behind him. Verbal cueing required throughout and ultimate cueing to proceed.    Delayed decision-making to recognize an upcoming turning marissa despite verbal prompting at time to do so, resulting in entry to left turning marissa and requiring cueing to complete a left turn due to being in marissa    Missed stop sign in [facility] parking lot requiring use of instructor brake; decreased  to read exit sign thereafter forcing a right turn when client's goal was to turn left, requiring verbal cueing to stop/pause and see the big picture   Stopping Distance Progressing Generally adequate in moderate traffic this session; maintains adequate cushion though  slightly delayed in braking response to other vehicles' tail lights at times   Following Distance WFL Appropriate following distance observed at highway speeds (55 MPH)   Attention to nonverbal communication Progressing Delayed decision-making at intersections where multiple vehicles arrive at approximately the same time; client fails to provide non-verbal gesture to yield ROW, though after increased time, proceeds while others yield to him   Marissa Changing   Progressing, challenged Client completes series of marissa changes in low traffic with noted deceleration during marissa change, requiring verbal cueing to maintain/increase speed. In more moderate traffic, client demonstrates difficulty judging/accepting available gap, requiring increased verbal cueing for initiation of marissa changes. Client consistently judges available gap as less than available; will require ongoing training.   Merging Initiated, challenged Absent yielding on ramp to join highway on-ramp, resulting in forced yielding of vehicle with ROW; with verbal prompting, client slows vehicle and performs head check to left, though due to delayed decision-making, allows the vehicle to slow into the on-ramp marissa with vehicle approaching from left.    Highway merge successful in low traffic; cueing required to identify exit ramps x3 due to decreased . Client performs 3 additional highway merges in relatively low traffic conditions with ample rear-murray gap, requiring preparatory cueing to signal early and initiate visual scanning early to identify gap.    Will require ongoing training in heavier traffic conditions.   Highway/ Expressway Driving Initiated Decreased  to identify highway exits, requiring verbal prompting to identify desired exits on approach.    Marissa control and speed generally appropriate to maintain position in the right marissa.    Client educated on courteous anticipatory marissa changes away from on-ramps to allow merging  traffic ample space, though due to delayed decision-making and decreased gap acceptance, client signals but is unable to complete marissa change.   Space Management (SIPDE) Progressing, challenged Increased difficulty noted this session to negotiate busier environments and more complex maneuvers. Decreased  observed for anticipated exits and marissa changes. Increased time required to assess gap. Will require ongoing training in more complex environments.   Parking: Stall Progressing Pulls into parking stall with good alignment   Parking: Angle NT     Parking: Parallel Assistance/ cueing required Not addressed this session. Will require further training.   Special Maneuver  (K-turn/ 3-pt turn/ U-turn) Progressing Missed turn and approaches right turn only exit, requiring client to turn around in parking lot; client requires increased time to scan and completes 3 point turn in parking lot   Navigation  Progressing Follows 1 step verbal commands consistently   Response to Emergency Vehicles  Not observed Will require further assessment in more complex driving environments.      ASSESSMENT/ PLAN:    Training progressed this session into more complex traffic to negotiate marissa changes, merging, and yielding scenarios with noted challenges with , gap acceptance, and decision-making, resulting in some decreased responsiveness to traffic signs and requiring increased support of . Client presently demonstrates some difficulty generalizing base driving skills into novel environments and will require ongoing training in a variety of driving environments and traffic situations in order to develop more timely decision-making and awareness of his surroundings. He continues to demonstrate uncertainty at times, resulting in delayed initiation/discontinuation of driving maneuvers to respond to changing traffic. He will continue to benefit from  training services in more complex traffic to  continue to assess his driving potential. Next session planned for July 20, 2021 at 2pm.      Vinny Rob MS, OTR/L, DRS, CDI  Occupational Therapist   Rehabilitation Specialist, Certified

## 2021-07-20 ENCOUNTER — HOSPITAL ENCOUNTER (OUTPATIENT)
Dept: REHABILITATION | Facility: REHABILITATION | Age: 26
Setting detail: THERAPIES SERIES
Discharge: HOME | End: 2021-07-20
Attending: FAMILY MEDICINE
Payer: COMMERCIAL

## 2021-07-20 DIAGNOSIS — F42.9 OBSESSIVE-COMPULSIVE DISORDER, UNSPECIFIED TYPE: ICD-10-CM

## 2021-07-20 DIAGNOSIS — F84.0 AUTISM SPECTRUM DISORDER: Primary | ICD-10-CM

## 2021-07-20 PROCEDURE — 99000018 HC VEHICLE LESSON 60 MIN

## 2021-07-20 NOTE — PROGRESS NOTES
"[facility]   REHABILITATION PROGRAM  PROGRESS NOTE    DATE OF SERVICE: 2021    NAME:  Chriss Rodriguez          : 1995          AGE: 25 y.o.          MRN: [id number]     ADDRESS:  [address] [place], [place] [address] [place] [address]     [facility] PHONE NUMBER:   [phone]    REFERRING PHYSICIAN:    Gypsy Kellogg MD  [address]  [place] [address]     FUNDING SOURCE:  [employer]      PURCHASE ORDER:  [id number]  COUNSELOR:  Anne Florentino, [phone]  LESSON HOURS TO DATE (including current):  4   TIME IN: 11:00   TIME OUT: 12:00   TOTAL TIME:  60 minutes    SUBJECTIVE:  \"I worked more on those harder places for commitment and it's getting better.\"    OBJECTIVE:    BASIC  EDUCATION:  The following driving education/ maneuvers were reviewed with the :  · ZAYAS SYSTEM:  See the big picture  ·  for intersections  · Traffic lights  · Gap selection for merging/marissa changes    ADAPTED EQUIPMENT:  None    TYPES OF ROAD:  · PARKING LOT:  [employer] [address]  · SECONDARY ROADS:  [address], [address]  · PRIMARY ROADS:  [address], [address]    Performance Measures Status  Comments   Transfer Status / Entry into Vehicle WNL     Pre-Driving Check Progressing, ongoing Needs cueing to initiate adjustment of mirrors before proceeding; adjusts side mirrors appropriately following initial prompting   Pedal Control  (Smooth Stopping/ Accelerating) Progressing Continues with firm stops in alternative vehicle ( [facility]) requiring cueing for earlier-onset braking to account for different pedal feel   Steering Control WFL Smooth steering observed to negotiate curves and turns on local roads   Speed Control Progressing, improving Improved speed this session to negotiate roads of 25-55 MPH speed limit; requires initial prompting to maintain speed while completing marissa changes (see below), " though improves with repetition   Marissa Control/ Vehicle Positioning Progressing Generally appropriate this session w/ single instance of R marissa drift   Turning  incl head checks      Progressing Hesitant to complete turns at complex intersections requiring some cueing to proceed in order to prevent excessive time spent in intersection; specifically requires cueing on two occasions of yellow lights and two occasions of green lights in which client continues scanning despite clear path   Turn Signal Use WFL Utilized consistently   Gap Selection Progressing Improving gap awareness and acceptance for marissa changes and merging this session. Since instance of difficulty due to uncertainty of nonverbal communication of other  (e.g. vehicle slows to stop gaining and permit marissa change); client appropriately discontinues attempted marissa changes when he feels there is inadequate gap   Sign/ Signal Awareness  incl regulatory and warning signs Progressing, challenged Improved this session from prior session of relative difficulty; continues with hesitance and increased anxiety surrounding yellow lights and uncertainty regarding appropriate action to take at fresh green lights after witnessing light change from red to green; client slows on two occasions to scan intersection before proceeding through despite green. Educated on prep scanning and  on approach to intersections   Stopping Distance Progressing Generally adequate in moderate traffic this session; maintains adequate cushion though slightly delayed in braking response to other vehicles' tail lights at times   Following Distance WFL Appropriate following distance observed at highway speeds   Attention to nonverbal communication Progressing Noted uncertainty regarding available gap despite vehicle slowing their advance to allow space for client to merge in front of them; requires verbal cueing to confirm intention of other    Marissa Changing    Progressing, improving Completes 9 marissa changes this session with improving gap acceptance in low-moderate traffic. Improved mirror use/head checks after initial prompting   Merging Progressing, improving Successfully merges in low traffic with cueing to maintain/increase speed rather than slowing down    Good head check observed on approach to merging point   Highway/ Expressway Driving Progressing Improved  to ID exit and make appropriate marissa changes to enter exit marissa   Space Management (SIPDE) Progressing, ongoing Variable, though gradually improved   Parking: Stall Progressing Pulls into parking stall with good alignment following single correction; requires minimal cue to look back before reversing   Parking: Angle NT     Parking: Parallel Assistance/ cueing required Not addressed this session. Will require further training.   Special Maneuver  (K-turn/ 3-pt turn/ U-turn) Progressing Not addressed this session   Navigation  Progressing Follows 1 step verbal commands consistently   Response to Emergency Vehicles  Not observed Will require further assessment in more complex driving environments.      ASSESSMENT/ PLAN:    Client demonstrates relative improvement this session over prior session, particularly in areas of traffic sign responsiveness and gap selection/acceptance after initial prompting. Noted progress for massed practice of individual skills (e.g. head checks/mirror use for marissa changes) with consecutive opportunities for skill development. Client will benefit from ongoing training in a variety of driving environments and traffic situations in order to develop more timely decision-making and progress toward more self-directed navigation. Next session planned for August 3, 2021 at 11AM.      Vinny Rob MS, OTR/L, DRS, CDI  Occupational Therapist   Rehabilitation Specialist, Certified

## 2021-07-20 NOTE — LETTER
"    [facility]  REHABILITATION PROGRAM  [address]  [address]  [phone]    2021    Office of Vocational Rehabilitation  Attn: Anne Florentino  [address] [facility]  [place] [address]    Re:     Chriss Rodriguez   :  1995   MRN:  [id number]    Dear Anne,    This letter is to inform you that Chriss used 3 out of 11 authorized units/hours on his current PO.  Please see the attached Driving Progress Report.  Below is a summary of provided services to date:    Authorization Number:  [id number]   Effective Date:  2021    Month of Service:  2021  Units Used This Month:  3  Units Used To Date:  3  Units Remainin    Thank you again for your referral.  Please feel free to call me with any questions regarding this case at [phone].    Sincerely,    Vinny Rob, MS, OTR/L, DRS, CDI  Occupational Therapist   Rehabilitation Specialist, Certified       Attachment(s):  Driving Progress Reports, 2021 Lesson Invoice      [facility]   REHABILITATION PROGRAM  PROGRESS NOTE     DATE OF SERVICE: 2021     NAME:  Chriss Rodriguez          : 1995          AGE: 25 y.o.          MRN: [id number]      ADDRESS:  [address] [place] [address] [address] [place] [address]      [facility] PHONE NUMBER:   [phone]     REFERRING PHYSICIAN:    Gypsy Kellogg MD  [address] [place] [address]  [address]  [place] [address]      FUNDING SOURCE:  R                                                                PURCHASE ORDER:  [id number]  COUNSELOR:  Anne Florentino, [phone]  LESSONS TO DATE (including current):  2              TIME IN:          11:20              TIME OUT:      12:16              TOTAL TIME:  56 minutes     SUBJECTIVE:  \"Well that was good. I had a great lesson.\"     OBJECTIVE:     BASIC  EDUCATION:  The following driving education/ maneuvers were reviewed with the :  · Right-of-Way  · Multi-marissa " "turning     ADAPTED EQUIPMENT:  None     TYPES OF ROAD:  · PARKING LOT:  [facility]  · SECONDARY ROADS:  [address], [address]/ [address], [address], [address], [address]  · PRIMARY ROADS:  N/A     Performance Measures Status  Comments   Transfer Status / Entry into Vehicle WNL     Pre-Driving Check Progressing Needs cueing to initiate adjustment of mirrors before proceeding; adjusts side mirrors appropriately following initial prompting   Pedal Control  (Smooth Stopping/ Accelerating) WFL Generally WFL acceleration and braking for local driving in moderate traffic.   Steering Control WFL Smooth steering observed to negotiate curves and turns on local roads   Speed Control Mild concerns noted Improved speed this session to negotiate roads of 25-55 MPH speed limit; requires cueing on one occasion to reduce speed in 25 MPH zone and 2 cues to increase speed while completing marissa changes, rather than decreasing speed.   Marissa Control/ Vehicle Positioning Progressing Improved marissa control this session; consistently selects near-marissa when turning onto multi-marissa roads; mild R-murray drifting observed x5 this session, though slight   Turning  incl head checks      Progressing Improved decision-making at intersections of varying size and complexity observed this session; client cautiously yields on yellow lights and awaits green; cueing required on one occasion at a Y shaped intersection in which the client had a green light and the adjacent road forming the \"Y\" had a red light; client paused with uncertainty due to car approaching from right to its respective red light, requiring verbal cueing to proceed with ROW; adequate speed control for turns observed this session from a stop and while moving; client demonstrates good carryover for completion of left turns at protected intersections with single exception as noted above   Turn Signal Use WFL " Utilized consistently   Gap Selection Progressing Selects appropriate gap for all turns this session   Sign/ Signal Awareness  incl regulatory and warning signs Progressing Good awareness of posted signs this session with single instance of late braking response to a red light   Stopping Distance Progressing Generally adequate with 1 instance of close stopping distance behind another vehicle in traffic and stops over intersection line x1   Following Distance WFL Appropriate following distance observed at highway speeds (55 MPH)   Attention to nonverbal communication Progressing Acknowledges gestural communication of other  yielding ROW to client at intersection   Marissa Changing    Initiated Completes 4 marissa changes in low traffic with good head checks and signalling; decreased speed control on 2 occasions requiring cueing to increase speed while completing marissa change to prevent undue slowing of distantly approaching traffic from behind   Merging Not observed Will require further assessment in more complex driving environments.   Highway/ Expressway Driving Not observed Will require further assessment in more complex driving environments.   Space Management (SIPDE) Progressing Good response to forward hazards; generally adequate lead for upcoming signal changes and braking traffic, though mildly delayed on 2 occasions     Will require further assessment in more complex driving environments.   Parking: Stall Mild concerns noted Pulls out of and into stall space with appropriate spacing, though requires cueing to look behind when backing up to make an alignment correction   Parking: Angle NT     Parking: Parallel Assistance/ cueing required Not addressed this session. Will require further training.   Special Maneuver  (K-turn/ 3-pt turn/ U-turn) Concerns noted Not addressed this session. Will require further training.   Navigation  Progressing Follows 1 step verbal commands consistently   Response to Emergency  "Vehicles  Not observed Will require further assessment in more complex driving environments.      ASSESSMENT/ PLAN:     Chriss returns highly motivated with personal goals of continuing to address decision-making and committing to a chosen decision. Performance this session was notably improved over prior sessions with evidence of carryover of prior instruction on turning speed and decision-making at intersections with minimal cueing. While he continues to demonstrate some uncertainty regarding right-of-way, client demonstrated improved understanding and execution this session on larger roads in more complex traffic. Plan for future sessions to address concepts of merging, gap acceptance, and yielding under more complex circumstances (e.g. highway merging, marissa changes under heavier traffic conditions). He will continue to benefit from skilled  training services to continue to assess his driving potential. Next session planned for 2021 at 2pm.     Vinny Rob MS, OTR/L, DRS, CDI  Occupational Therapist   Rehabilitation Specialist, Certified                             [facility]   REHABILITATION PROGRAM  PROGRESS NOTE     DATE OF SERVICE: 2021     NAME:  Chriss Rodriguez          : 1995          AGE: 25 y.o.          MRN: [id number]      ADDRESS:  [address] [place] [address] [address] [place] [address]      BEST PHONE NUMBER:   [phone]     REFERRING PHYSICIAN:    Gypsy Kellogg MD  [address] [place] [address]  [address]  [place] [address]      FUNDING SOURCE:  [facility]                                                                PURCHASE ORDER:  [id number]  COUNSELOR:  Anne Florentino, [phone]  LESSON HOURS TO DATE (including current):  3              TIME IN:          14:00              TIME OUT:      15:05              TOTAL TIME:  65 minutes     SUBJECTIVE:  \"I need to work on commitment.\"     OBJECTIVE:     BASIC  EDUCATION:  The following driving " education/ maneuvers were reviewed with the :  · ZAYAS SYSTEM:  See the big picture  · Right-of-Way  · Gap selection for merging/marissa changes     ADAPTED EQUIPMENT:  None     TYPES OF ROAD:  · P[facility]ING [facility]  [facility]  · SECONDARY ROADS:  [address], [address] [place]/ [address], [address]  · PRIMARY ROADS:  [address], [address]     Performance Measures Status  Comments   Transfer Status / Entry into Vehicle WNL     Pre-Driving Check Progressing, ongoing Needs cueing to initiate adjustment of mirrors before proceeding; adjusts side mirrors appropriately following initial prompting   Pedal Control  (Smooth Stopping/ Accelerating) WFL Generally WFL acceleration and braking for local driving in moderate traffic. Some difficulty transitioning to different vehicle this session (2013 [address]) with graded pressure on brake, though generally WFL   Steering Control WFL Smooth steering observed to negotiate curves and turns on local roads   Speed Control Progressing, ongoing Improved speed this session to negotiate roads of 25-55 MPH speed limit; requires cueing to maintain speed while completing marissa changes (see below), though otherwise demonstrates generally appropriate speed   Marissa Control/ Vehicle Positioning Progressing Continues to favor R side of road in attempts to avoid oncoming traffic from L, though maintains marissa adequately   Turning  incl head checks      Progressing Client demonstrates appropriate speed control through turns, though continues to be hesitant in selecting timing of turn; continues with appropriate head checks at turns to ensure a clear path   Turn Signal Use WFL Utilized consistently   Gap Selection Progressing Selects appropriate gap for all turns this session, favoring to yield until larger gap available; difficulty judging/accepting gap for marissa changes in traffic, resulting at times in delayed initiation of marissa change and eventual  discontinuation of marissa change due to loss of adequate gap   Sign/ Signal Awareness  incl regulatory and warning signs Progressing, challenged Delayed decision-making in response to yellow light in which client had enough time to stop and/or maintain speed to clear intersection; client instead coasts/decelerates in response to yellow light and proceeds through intersection with light turning red     Fails to decisively yield on ramp to join highway on-ramp, slowing in response with delayed decision-making and coasting into on-ramp marissa, forcing vehicle with ROW to slow/stop behind him. Verbal cueing required throughout and ultimate cueing to proceed.     Delayed decision-making to recognize an upcoming turning marissa despite verbal prompting at time to do so, resulting in entry to left turning marissa and requiring cueing to complete a left turn due to being in marissa     Missed stop sign in [place] parking lot requiring use of instructor brake; decreased  to read exit sign thereafter forcing a right turn when client's goal was to turn left, requiring verbal cueing to stop/pause and see the big picture   Stopping Distance Progressing Generally adequate in moderate traffic this session; maintains adequate cushion though slightly delayed in braking response to other vehicles' tail lights at times   Following Distance WFL Appropriate following distance observed at highway speeds (55 MPH)   Attention to nonverbal communication Progressing Delayed decision-making at intersections where multiple vehicles arrive at approximately the same time; client fails to provide non-verbal gesture to yield ROW, though after increased time, proceeds while others yield to him   Marissa Changing    Progressing, challenged Client completes series of marissa changes in low traffic with noted deceleration during marissa change, requiring verbal cueing to maintain/increase speed. In more moderate traffic, client demonstrates difficulty  judging/accepting available gap, requiring increased verbal cueing for initiation of marissa changes. Client consistently judges available gap as less than available; will require ongoing training.   Merging Initiated, challenged Absent yielding on ramp to join highway on-ramp, resulting in forced yielding of vehicle with ROW; with verbal prompting, client slows vehicle and performs head check to left, though due to delayed decision-making, allows the vehicle to slow into the on-ramp marissa with vehicle approaching from left.     Highway merge successful in low traffic; cueing required to identify exit ramps x3 due to decreased . Client performs 3 additional highway merges in relatively low traffic conditions with ample rear-murray gap, requiring preparatory cueing to signal early and initiate visual scanning early to identify gap.     Will require ongoing training in heavier traffic conditions.   Highway/ Expressway Driving Initiated Decreased  to identify highway exits, requiring verbal prompting to identify desired exits on approach.     Marissa control and speed generally appropriate to maintain position in the right marissa.     Client educated on courteous anticipatory marissa changes away from on-ramps to allow merging traffic ample space, though due to delayed decision-making and decreased gap acceptance, client signals but is unable to complete marissa change.   Space Management (SIPDE) Progressing, challenged Increased difficulty noted this session to negotiate busier environments and more complex maneuvers. Decreased  observed for anticipated exits and marissa changes. Increased time required to assess gap. Will require ongoing training in more complex environments.   Parking: Stall Progressing Pulls into parking stall with good alignment   Parking: Angle NT     Parking: Parallel Assistance/ cueing required Not addressed this session. Will require further training.   Special Maneuver  (K-turn/  3-pt turn/ U-turn) Progressing Missed turn and approaches right turn only exit, requiring client to turn around in parking lot; client requires increased time to scan and completes 3 point turn in parking lot   Navigation  Progressing Follows 1 step verbal commands consistently   Response to Emergency Vehicles  Not observed Will require further assessment in more complex driving environments.      ASSESSMENT/ PLAN:     Training progressed this session into more complex traffic to negotiate marissa changes, merging, and yielding scenarios with noted challenges with , gap acceptance, and decision-making, resulting in some decreased responsiveness to traffic signs and requiring increased support of . Client presently demonstrates some difficulty generalizing base driving skills into novel environments and will require ongoing training in a variety of driving environments and traffic situations in order to develop more timely decision-making and awareness of his surroundings. He continues to demonstrate uncertainty at times, resulting in delayed initiation/discontinuation of driving maneuvers to respond to changing traffic. He will continue to benefit from  training services in more complex traffic to continue to assess his driving potential. Next session planned for 2021 at 2pm.     Vinny Rob MS, OTR/L, DRS, CDI  Occupational Therapist   Rehabilitation Specialist, Certified                                   [facility]IM[facility]   REHABILITATION PROGRAM  PROGRESS NOTE     DATE OF SERVICE: 2021     NAME:  Chriss Rodriguez          : 1995          AGE: 25 y.o.          MRN: [id number]      ADDRESS:  [address] [place] [address] [address] [place] [address]      [facility] PHONE NUMBER:   [phone]     REFERRING PHYSICIAN:    Gypsy Kellogg MD  [address] [place] [address] Rd  [address]  [place] [address]      FUNDING SOURCE:  [facility]                                  "                               PURCHASE ORDER:  [id number]  COUNSELOR:  Anne FRANKMarinaBalbir, [phone]  LESSON HOURS TO DATE (including current):  4              TIME IN:          11:00              TIME OUT:      12:00              TOTAL TIME:  60 minutes     SUBJECTIVE:  \"I worked more on those harder places for commitment and it's getting better.\"     OBJECTIVE:     BASIC  EDUCATION:  The following driving education/ maneuvers were reviewed with the :  · ZAYAS SYSTEM:  See the big picture  ·  for intersections  · Traffic lights  · Gap selection for merging/marissa changes     ADAPTED EQUIPMENT:  None     TYPES OF ROAD:  · PARKING LOT:  [email or web address]  · SECONDARY ROADS:  [address], [address]  · PRIMARY ROADS:  [address], [address]     Performance Measures Status  Comments   Transfer Status / Entry into Vehicle WNL     Pre-Driving Check Progressing, ongoing Needs cueing to initiate adjustment of mirrors before proceeding; adjusts side mirrors appropriately following initial prompting   Pedal Control  (Smooth Stopping/ Accelerating) Progressing Continues with firm stops in alternative vehicle ([address] [place]) requiring cueing for earlier-onset braking to account for different pedal feel   Steering Control WFL Smooth steering observed to negotiate curves and turns on local roads   Speed Control Progressing, improving Improved speed this session to negotiate roads of 25-55 MPH speed limit; requires initial prompting to maintain speed while completing marissa changes (see below), though improves with repetition   Marissa Control/ Vehicle Positioning Progressing Generally appropriate this session w/ single instance of R marissa drift   Turning  incl head checks      Progressing Hesitant to complete turns at complex intersections requiring some cueing to proceed in order to prevent excessive time spent in intersection; " specifically requires cueing on two occasions of yellow lights and two occasions of green lights in which client continues scanning despite clear path   Turn Signal Use WFL Utilized consistently   Gap Selection Progressing Improving gap awareness and acceptance for marissa changes and merging this session. Since instance of difficulty due to uncertainty of nonverbal communication of other  (e.g. vehicle slows to stop gaining and permit marissa change); client appropriately discontinues attempted marissa changes when he feels there is inadequate gap   Sign/ Signal Awareness  incl regulatory and warning signs Progressing, challenged Improved this session from prior session of relative difficulty; continues with hesitance and increased anxiety surrounding yellow lights and uncertainty regarding appropriate action to take at fresh green lights after witnessing light change from red to green; client slows on two occasions to scan intersection before proceeding through despite green. Educated on prep scanning and  on approach to intersections   Stopping Distance Progressing Generally adequate in moderate traffic this session; maintains adequate cushion though slightly delayed in braking response to other vehicles' tail lights at times   Following Distance WFL Appropriate following distance observed at highway speeds   Attention to nonverbal communication Progressing Noted uncertainty regarding available gap despite vehicle slowing their advance to allow space for client to merge in front of them; requires verbal cueing to confirm intention of other    Marissa Changing    Progressing, improving Completes 9 marissa changes this session with improving gap acceptance in low-moderate traffic. Improved mirror use/head checks after initial prompting   Merging Progressing, improving Successfully merges in low traffic with cueing to maintain/increase speed rather than slowing down     Good head check observed on approach  to merging point   Highway/ Expressway Driving Progressing Improved  to ID exit and make appropriate marissa changes to enter exit marissa   Space Management (SIPDE) Progressing, ongoing Variable, though gradually improved   Parking: Stall Progressing Pulls into parking stall with good alignment following single correction; requires minimal cue to look back before reversing   Parking: Angle NT     Parking: Parallel Assistance/ cueing required Not addressed this session. Will require further training.   Special Maneuver  (K-turn/ 3-pt turn/ U-turn) Progressing Not addressed this session   Navigation  Progressing Follows 1 step verbal commands consistently   Response to Emergency Vehicles  Not observed Will require further assessment in more complex driving environments.      ASSESSMENT/ PLAN:     Client demonstrates relative improvement this session over prior session, particularly in areas of traffic sign responsiveness and gap selection/acceptance after initial prompting. Noted progress for massed practice of individual skills (e.g. head checks/mirror use for marissa changes) with consecutive opportunities for skill development. Client will benefit from ongoing training in a variety of driving environments and traffic situations in order to develop more timely decision-making and progress toward more self-directed navigation. Next session planned for August 3, 2021 at 11AM.     Vinny Rob, MS, OTR/L, DRS, CDI  Occupational Therapist   Rehabilitation Specialist, Certified

## 2021-08-13 ENCOUNTER — HOSPITAL ENCOUNTER (OUTPATIENT)
Dept: REHABILITATION | Facility: REHABILITATION | Age: 26
Setting detail: THERAPIES SERIES
Discharge: HOME | End: 2021-08-13
Attending: FAMILY MEDICINE
Payer: COMMERCIAL

## 2021-08-13 DIAGNOSIS — F84.0 AUTISM SPECTRUM DISORDER: Primary | ICD-10-CM

## 2021-08-13 DIAGNOSIS — F42.9 OBSESSIVE-COMPULSIVE DISORDER, UNSPECIFIED TYPE: ICD-10-CM

## 2021-08-13 PROCEDURE — 99000018 HC VEHICLE LESSON 60 MIN

## 2021-08-13 NOTE — PROGRESS NOTES
"[facility]   REHABILITATION PROGRAM  PROGRESS NOTE    DATE OF SERVICE: 2021    NAME:  Chriss Rodriguez          : 1995          AGE: 25 y.o.          MRN: [id number]     ADDRESS:  [address] [place] [address] [address] [place] [place] [address]     [facility] PHONE NUMBER:   [phone]    REFERRING PHYSICIAN:    Gypsy Kellogg MD  [address]  [place] [address]     FUNDING SOURCE:  [facility]      PURCHASE ORDER:  [id number]  COUNSELOR:  Anne Florentino, [phone]  LESSON HOURS TO DATE (including current):  5   TIME IN: 08:05   TIME OUT: 09:00   TOTAL TIME:  55 minutes    SUBJECTIVE:  \"I was doing better with driving, but I had some 'hiccups' this morning with determining what to do at intersections.\"    OBJECTIVE:    BASIC  EDUCATION:  The following driving education/ maneuvers were reviewed with the :  · ZAYAS SYSTEM:  See the big picture  ·  for intersections  · Traffic lights    ADAPTED EQUIPMENT:  None    TYPES OF ROAD:  · PARKING LOT:  [employer] [address]  · SECONDARY ROADS:  [address], [address]  · PRIMARY ROADS:  [address], [address]    Performance Measures Status  Comments   Transfer Status / Entry into Vehicle WNL     Pre-Driving Check Progressing Attempts to initiate driving without first adjusting mirrors, requiring redirection; independently adjusts after redirection   Pedal Control  (Smooth Stopping/ Accelerating) Progressing Generally smooth pedal control, however ongoing concerns with delayed initiation of braking resulting in firm braking at times.   Steering Control WFL Smooth steering observed throughout   Speed Control Progressing Adequate speed control observed this session, including completion of limited marissa changes   Marissa Control/ Vehicle Positioning Progressing Adequate marissa control this session without notable drifting or marissa departure   Turning  incl head checks      Progressing Improved " initiation of turns at intersections    Fails to recognize unprotected left turn on one occasion at complex intersection, requiring use of instructor brake and verbal redirection due to attempted turn across oncoming traffic   Turn Signal Use WFL Utilized consistently   Gap Selection Progressing Decreased gap selection at intersection misjudged as a protected left turn    Adequate gap selection for 2 marissa changes, though requires minimal cueing in moderate traffic due to decreased  of slowing vehicle in adjacent marissa; client attending to vehicles behind him in adjacent marissa to  gap, however requires verbal cueing for redirection to attend to slowing vehicle forward of him in adjacent marissa   Sign/ Signal Awareness  incl regulatory and warning signs Progressing Variable performance with relative improvement during majority of today's session, however with some hesitation at green lights and delayed braking on approach to distant red lights; one instance of misjudging green light for unprotected left turn   Stopping Distance Progressing Stops adequately behind other vehicles, though more firmly than ideal due to delayed initiation of braking/recognition of forward vehicles' brake lights   Following Distance WFL Appropriate following distance observed at highway speeds   Attention to nonverbal communication Progressing Good awareness of merging vehicle from right to complete courteous marissa shift, however fails to recognize vehicle tailgating him in passing marissa as indication of need to move over   Marissa Changing   Progressing, improving Completes 4 marissa changes, requiring some verbal cueing for space management to attend to forward gap in addition to rearward gap; improved speed control to complete.   Merging Progressing, improving Successfully merges in low traffic with cueing for timing of signal, though improved speed to join highway; good head check and mirror check observed on approach to merging  point   Highway/ Expressway Driving Progressing Delayed  to ID exit, entering exit marissa at transition from dashed white line to solid white line; client spontaneously completes prior courteous marissa shift to allow merging space for vehicle entering roadway from right, though fails to recognize vehicle tailgating him in passing marissa as indication of his need to move over.   Space Management (SIPDE) Progressing Continues with variable , largely impacted by self-distraction; under more complex conditions, self-distraction via conversation noted to result in delayed processing of driving environment and slowed decision-making resulting in errors   Parking: Stall Progressing Pulls into parking stall with good alignment at end of session   Parking: Angle NT     Parking: Parallel Assistance/ cueing required Not addressed this session. Will require further training.   Special Maneuver  (K-turn/ 3-pt turn/ U-turn) Progressing Not addressed this session   Navigation  Progressing Follows 1 step verbal commands consistently   Response to Emergency Vehicles  Not observed Will require further assessment in more complex driving environments.      ASSESSMENT/ PLAN:    Chriss returns for ongoing  training with emphasis on  and decision-making at intersections. Client noted with generally appropriate basic driving behaviors, though observed with tendency toward self-distraction through conversation resulting in decreased  and difficulty at times with timely processing of more complex driving environments. Reduced  resulting at times in delayed braking on approach to yellow/red lights requiring verbal prompting. Instructor brake required on one occasion due to uncertainty on approach to a green traffic light where he was preparing to make a left turn, resulting in failure to recognize turn as an unprotected turn requiring yielding to oncoming traffic. Performance reviewed  with client and mother with recommendation for future implementation of distraction-limiting strategies to assess ability for self-management. Client will benefit from ongoing training in a variety of driving environments and traffic situations in order to develop consistency of safety behaviors and situational awareness. Next session planned for August 18, 2021 at 11AM.      Vinny Rob MS, OTR/L, JOSEPH, CDI  Occupational Therapist   Rehabilitation Specialist, Certified

## 2021-08-18 ENCOUNTER — HOSPITAL ENCOUNTER (OUTPATIENT)
Dept: REHABILITATION | Facility: REHABILITATION | Age: 26
Setting detail: THERAPIES SERIES
Discharge: HOME | End: 2021-08-18
Attending: FAMILY MEDICINE
Payer: COMMERCIAL

## 2021-08-18 DIAGNOSIS — F84.0 AUTISM SPECTRUM DISORDER: Primary | ICD-10-CM

## 2021-08-18 DIAGNOSIS — F42.9 OBSESSIVE-COMPULSIVE DISORDER, UNSPECIFIED TYPE: ICD-10-CM

## 2021-08-18 PROCEDURE — 99000018 HC VEHICLE LESSON 60 MIN

## 2021-08-18 NOTE — PROGRESS NOTES
"[facility]   REHABILITATION PROGRAM  PROGRESS NOTE    DATE OF SERVICE: 2021    NAME:  Chriss Rodriguez          : 1995          AGE: 25 y.o.          MRN: [id number]     ADDRESS:  [address] [place], [place] [address] [place] [address] [place] [address]     [facility] PHONE NUMBER:   [phone]    REFERRING PHYSICIAN:    Gypsy Kellogg MD  [address]  [place] [address]     FUNDING SOURCE:  [facility]      PURCHASE ORDER:  [id number]  COUNSELOR:  Anne Florentino, [phone]  LESSON HOURS TO DATE (including current):  6   TIME IN: 11:08   TIME OUT: 12:10   TOTAL TIME:  62 minutes    SUBJECTIVE:  \"The distraction area is definitely still an area I need to work on.\"    OBJECTIVE:    BASIC  EDUCATION:  The following driving education/ maneuvers were reviewed with the :  · ZAYAS SYSTEM:  See the big picture  ·  for intersections  · Traffic lights  · Distraction-limiting strategies including no radio, no extraneous conversation    ADAPTED EQUIPMENT:  None    TYPES OF ROAD:  · PARKING LOT:  [employer] [address]  · SECONDARY ROADS:  [address], [address] [place], [address]  · PRIMARY ROADS:  [address]    Performance Measures Status  Comments   Transfer Status / Entry into Vehicle WNL     Pre-Driving Check Progressing Adjusts seat/mirrors without cueing this session   Pedal Control  (Smooth Stopping/ Accelerating) Progressing Generally smooth pedal control for both acceleration and braking.   Steering Control WFL Smooth steering observed throughout   Speed Control Progressing Excessive speed observed at times requiring verbal cueing for correction, including 45-48 MPH in 35 MPH zone x3 and speeds of 60-68 MPH on highway in 55 MPH zone. Client re-educated on need for more deliberate observance of speed zones for safety and [employer] testing.   Marissa Control/ Vehicle Positioning WFL Adequate marissa control this session " without notable drifting or marissa departure    Negotiates narrow bridges x2 slowly but with appropriate marissa control throughout.   Turning  incl head checks      Progressing Improved initiation and spacing of turns observed this session with self-correction of minimal tight turn to L. Client noted with 2 instances of slow turning to complete right of red and unprotected left turn on green, though ultimately completing safely.   Turn Signal Use WFL Requires minimal cue to discontinue turn signal after turn fails to cancel signal automatically   Gap Selection Progressing, improving Good awareness of traffic to identify adequate gap in moderate traffic to complete highway merge    Appropriately discontinues attempted late marissa change due to awareness of inadequate gap   Sign/ Signal Awareness  incl regulatory and warning signs Progressing, improving Improved awareness of traffic signs/signals this session with timely braking on approach to yellow/red lights. Late stopping at 2 stop signs resulting in stopping slightly past line; re-education provided.   Stopping Distance Progressing, improving Improved initiation of braking on approach to stops/signals, and perceived hazards   Following Distance WFL Appropriate following distance observed at highway speeds   Attention to nonverbal communication Progressing Client mimics other drivers utilizing horn to notify vehicle at traffic light of change in traffic light; client educated on purpose of horn for emergency use only and discouraged from similar behavior in future. Client reports feeling confused when hearing horn from other drivers, and educated on similar effect on others of his own horn use in this situation.   Marissa Changing   Progressing, improving Client completes effective and quick marissa change to avoid turning marissa with appropriate signalling and head check.    Discontinues attempted marissa change appropriately after late recognition of desired turn due to limited  forward/rearward gap.   Merging Progressing, improving Smooth highway merge completed in moderate traffic with good head check, signalling, and speed control   Highway/ Expressway Driving Progressing Appropriate  for desired exit; travels with traffic, though requires cueing to monitor speed due to traveling at times in excess of 10 MPH over speed limit   Space Management (SIPDE) Progressing Improved  observed this session to specifically limit distraction; 1 instance of short lead to ID upcoming turn after preparatory cueing, however locates appropriate turn around and returns effectively.   Parking: Stall Progressing Pulls into parking stall x2 with good alignment at end of session   Parking: Angle NT     Parking: Parallel Assistance/ cueing required Not addressed this session. Will require further training.   Special Maneuver  (K-turn/ 3-pt turn/ U-turn) Progressing Turns around in business parking lot appropriately after missing turn   Navigation  Progressing Follows 1 step verbal commands consistently   Response to Emergency Vehicles  Not observed Will require further assessment in more complex driving environments.      ASSESSMENT/ PLAN:    Chriss returns for ongoing  training with emphasis on focused attention, , and functional decision-making. With distractions limited to only those present within the external environment, client demonstrates greatly improved focus and  this session to anticipate stops and turns, respond to traffic, and execute various driving maneuvers in moderate traffic. Client continues with slower processing of intersections at times, though greatly improved this session overall while utilizing compensatory strategies for focused attention to his driving. Overall, timing and initiation of braking improved this session without need for  intervention and minimal verbal cueing. Performance reviewed with client and mother  with recommendation for continuation of distraction-limiting strategies to support self-management. Client will continue to benefit from supportive  training services to progress his skills in more complex and nuanced driving scenarios. No follow-up sessions scheduled at present, though DRP remains available pending client review of personal schedule.      Vinny Rob MS, OTR/L, DRS, CDI  Occupational Therapist   Rehabilitation Specialist, Certified

## 2022-01-24 ENCOUNTER — DOCUMENTATION (OUTPATIENT)
Dept: REHABILITATION | Facility: REHABILITATION | Age: 27
End: 2022-01-24
Payer: COMMERCIAL

## 2022-01-24 NOTE — PROGRESS NOTES
Last appointment on 8/18/2021 with subsequent attempts made to schedule ongoing training, though declined and/or no-show. Correspondence received from [facility] counselor 1/20/2022 indicating client had earned his 's license elsewhere. Client discharged from [facility].    Vinny Rob MS, OTR/L, P, CDI  Occupational Therapist   Rehabilitation Professional, Certified